# Patient Record
Sex: FEMALE | Race: OTHER | HISPANIC OR LATINO | Employment: FULL TIME | URBAN - METROPOLITAN AREA
[De-identification: names, ages, dates, MRNs, and addresses within clinical notes are randomized per-mention and may not be internally consistent; named-entity substitution may affect disease eponyms.]

---

## 2020-06-17 ENCOUNTER — HOSPITAL ENCOUNTER (EMERGENCY)
Facility: HOSPITAL | Age: 20
Discharge: HOME | End: 2020-06-18
Attending: EMERGENCY MEDICINE
Payer: COMMERCIAL

## 2020-06-17 DIAGNOSIS — O20.9 VAGINAL BLEEDING IN PREGNANCY, FIRST TRIMESTER: Primary | ICD-10-CM

## 2020-06-17 DIAGNOSIS — O23.41 URINARY TRACT INFECTION IN MOTHER DURING FIRST TRIMESTER OF PREGNANCY: ICD-10-CM

## 2020-06-17 LAB
B-HCG UR QL: POSITIVE
CTP QC/QA: YES

## 2020-06-17 PROCEDURE — 99284 EMERGENCY DEPT VISIT MOD MDM: CPT | Mod: 25

## 2020-06-17 PROCEDURE — 81025 URINE PREGNANCY TEST: CPT | Performed by: PHYSICIAN ASSISTANT

## 2020-06-17 PROCEDURE — 87077 CULTURE AEROBIC IDENTIFY: CPT

## 2020-06-17 PROCEDURE — 87186 SC STD MICRODIL/AGAR DIL: CPT

## 2020-06-17 PROCEDURE — 87086 URINE CULTURE/COLONY COUNT: CPT

## 2020-06-17 PROCEDURE — 81000 URINALYSIS NONAUTO W/SCOPE: CPT

## 2020-06-17 PROCEDURE — 85025 COMPLETE CBC W/AUTO DIFF WBC: CPT

## 2020-06-17 PROCEDURE — 80053 COMPREHEN METABOLIC PANEL: CPT

## 2020-06-17 PROCEDURE — 87088 URINE BACTERIA CULTURE: CPT

## 2020-06-17 PROCEDURE — 84702 CHORIONIC GONADOTROPIN TEST: CPT

## 2020-06-17 NOTE — Clinical Note
Ike Moore was seen and treated in our emergency department on 6/17/2020.  She may return to work on 06/19/2020.       If you have any questions or concerns, please don't hesitate to call.       LPN

## 2020-06-18 VITALS
RESPIRATION RATE: 18 BRPM | HEART RATE: 90 BPM | BODY MASS INDEX: 23.3 KG/M2 | DIASTOLIC BLOOD PRESSURE: 73 MMHG | WEIGHT: 108 LBS | SYSTOLIC BLOOD PRESSURE: 100 MMHG | TEMPERATURE: 99 F | HEIGHT: 57 IN

## 2020-06-18 LAB
ABO + RH BLD: NORMAL
ALBUMIN SERPL BCP-MCNC: 4.3 G/DL (ref 3.5–5.2)
ALP SERPL-CCNC: 80 U/L (ref 55–135)
ALT SERPL W/O P-5'-P-CCNC: 17 U/L (ref 10–44)
ANION GAP SERPL CALC-SCNC: 9 MMOL/L (ref 8–16)
AST SERPL-CCNC: 15 U/L (ref 10–40)
BACTERIA #/AREA URNS HPF: ABNORMAL /HPF
BASOPHILS # BLD AUTO: 0.03 K/UL (ref 0–0.2)
BASOPHILS NFR BLD: 0.3 % (ref 0–1.9)
BILIRUB SERPL-MCNC: 0.3 MG/DL (ref 0.1–1)
BILIRUB UR QL STRIP: NEGATIVE
BLD GP AB SCN CELLS X3 SERPL QL: NORMAL
BUN SERPL-MCNC: 9 MG/DL (ref 6–20)
CALCIUM SERPL-MCNC: 9.7 MG/DL (ref 8.7–10.5)
CHLORIDE SERPL-SCNC: 104 MMOL/L (ref 95–110)
CLARITY UR: CLEAR
CO2 SERPL-SCNC: 23 MMOL/L (ref 23–29)
COLOR UR: YELLOW
CREAT SERPL-MCNC: 0.8 MG/DL (ref 0.5–1.4)
DIFFERENTIAL METHOD: ABNORMAL
EOSINOPHIL # BLD AUTO: 0.1 K/UL (ref 0–0.5)
EOSINOPHIL NFR BLD: 0.7 % (ref 0–8)
ERYTHROCYTE [DISTWIDTH] IN BLOOD BY AUTOMATED COUNT: 12.1 % (ref 11.5–14.5)
EST. GFR  (AFRICAN AMERICAN): >60 ML/MIN/1.73 M^2
EST. GFR  (NON AFRICAN AMERICAN): >60 ML/MIN/1.73 M^2
GLUCOSE SERPL-MCNC: 71 MG/DL (ref 70–110)
GLUCOSE UR QL STRIP: NEGATIVE
HCG INTACT+B SERPL-ACNC: NORMAL MIU/ML
HCT VFR BLD AUTO: 40 % (ref 37–48.5)
HGB BLD-MCNC: 13.2 G/DL (ref 12–16)
HGB UR QL STRIP: ABNORMAL
IMM GRANULOCYTES # BLD AUTO: 0.04 K/UL (ref 0–0.04)
IMM GRANULOCYTES NFR BLD AUTO: 0.4 % (ref 0–0.5)
KETONES UR QL STRIP: NEGATIVE
LEUKOCYTE ESTERASE UR QL STRIP: NEGATIVE
LYMPHOCYTES # BLD AUTO: 1.8 K/UL (ref 1–4.8)
LYMPHOCYTES NFR BLD: 17.3 % (ref 18–48)
MCH RBC QN AUTO: 29.1 PG (ref 27–31)
MCHC RBC AUTO-ENTMCNC: 33 G/DL (ref 32–36)
MCV RBC AUTO: 88 FL (ref 82–98)
MICROSCOPIC COMMENT: ABNORMAL
MONOCYTES # BLD AUTO: 0.8 K/UL (ref 0.3–1)
MONOCYTES NFR BLD: 7.8 % (ref 4–15)
NEUTROPHILS # BLD AUTO: 7.8 K/UL (ref 1.8–7.7)
NEUTROPHILS NFR BLD: 73.5 % (ref 38–73)
NITRITE UR QL STRIP: POSITIVE
NRBC BLD-RTO: 0 /100 WBC
PH UR STRIP: 7 [PH] (ref 5–8)
PLATELET # BLD AUTO: 301 K/UL (ref 150–350)
PMV BLD AUTO: 10.3 FL (ref 9.2–12.9)
POTASSIUM SERPL-SCNC: 3.9 MMOL/L (ref 3.5–5.1)
PROT SERPL-MCNC: 7.9 G/DL (ref 6–8.4)
PROT UR QL STRIP: NEGATIVE
RBC # BLD AUTO: 4.53 M/UL (ref 4–5.4)
RBC #/AREA URNS HPF: 2 /HPF (ref 0–4)
SODIUM SERPL-SCNC: 136 MMOL/L (ref 136–145)
SP GR UR STRIP: 1.02 (ref 1–1.03)
URN SPEC COLLECT METH UR: ABNORMAL
UROBILINOGEN UR STRIP-ACNC: NEGATIVE EU/DL
WBC # BLD AUTO: 10.63 K/UL (ref 3.9–12.7)
WBC #/AREA URNS HPF: 20 /HPF (ref 0–5)

## 2020-06-18 PROCEDURE — 25000003 PHARM REV CODE 250: Performed by: EMERGENCY MEDICINE

## 2020-06-18 PROCEDURE — 86850 RBC ANTIBODY SCREEN: CPT

## 2020-06-18 RX ORDER — FAMOTIDINE 20 MG
1 TABLET ORAL DAILY
Qty: 30 TABLET | Refills: 0 | Status: SHIPPED | OUTPATIENT
Start: 2020-06-18 | End: 2021-06-18

## 2020-06-18 RX ORDER — CEPHALEXIN 500 MG/1
500 CAPSULE ORAL 4 TIMES DAILY
Qty: 20 CAPSULE | Refills: 0 | Status: SHIPPED | OUTPATIENT
Start: 2020-06-18 | End: 2020-06-23

## 2020-06-18 RX ORDER — CEPHALEXIN 500 MG/1
500 CAPSULE ORAL
Status: COMPLETED | OUTPATIENT
Start: 2020-06-18 | End: 2020-06-18

## 2020-06-18 RX ADMIN — CEPHALEXIN 500 MG: 500 CAPSULE ORAL at 01:06

## 2020-06-18 NOTE — ED PROVIDER NOTES
Encounter Date: 2020    SCRIBE #1 NOTE: I, Kierra Sandy, am scribing for, and in the presence of,  Favian He MD. I have scribed the entire note.       History     Chief Complaint   Patient presents with    Vaginal Bleeding     States she is 8 weeks pregnant.  Complaining of spotting.  Went to OB today.     Time seen by provider: 12:38 AM    This is a A1 8 WGA 20 y.o. female with no significant PMHx on file who presents with complaint of vaginal bleeding. Her associated symptoms include abdominal pain earlier this week that has since resolved. The patient denies dysuria or any other concerning symptoms. The patient has no other medical complaints or concerns at this time.     The history is provided by the patient.     Review of patient's allergies indicates:  No Known Allergies  No past medical history on file.  No past surgical history on file.  No family history on file.  Social History     Tobacco Use    Smoking status: Not on file   Substance Use Topics    Alcohol use: Not on file    Drug use: Not on file     Review of Systems   Genitourinary: Positive for vaginal bleeding. Negative for dysuria.   All other systems reviewed and are negative.      Physical Exam     Initial Vitals [20 2304]   BP Pulse Resp Temp SpO2   103/68 94 18 98.8 °F (37.1 °C) --      MAP       --         Physical Exam    Nursing note and vitals reviewed.  Constitutional: She appears well-developed and well-nourished. She is not diaphoretic. No distress.   HENT:   Head: Normocephalic and atraumatic.   Right Ear: Tympanic membrane normal.   Left Ear: Tympanic membrane normal.   Mouth/Throat: Oropharynx is clear and moist.   Eyes: Conjunctivae and EOM are normal. Pupils are equal, round, and reactive to light.   Neck: Normal range of motion. Neck supple.   Cardiovascular: Normal rate, regular rhythm and normal heart sounds. Exam reveals no gallop and no friction rub.    No murmur heard.  Pulmonary/Chest: Breath  sounds normal. She has no wheezes. She has no rhonchi. She has no rales.   Abdominal: Soft. Bowel sounds are normal. There is no abdominal tenderness. There is no rebound and no guarding.   Musculoskeletal: Normal range of motion. No tenderness or edema.   Lymphadenopathy:     She has no cervical adenopathy.   Neurological: She is alert and oriented to person, place, and time. She has normal strength.   Skin: Skin is warm and dry. Capillary refill takes less than 2 seconds. No rash noted.         ED Course   Procedures  Labs Reviewed   CBC W/ AUTO DIFFERENTIAL - Abnormal; Notable for the following components:       Result Value    Gran # (ANC) 7.8 (*)     Gran% 73.5 (*)     Lymph% 17.3 (*)     All other components within normal limits   URINALYSIS, REFLEX TO URINE CULTURE - Abnormal; Notable for the following components:    Occult Blood UA 2+ (*)     Nitrite, UA Positive (*)     All other components within normal limits    Narrative:     Preferred Collection Type->Urine, Clean Catch  Specimen Source->Urine   URINALYSIS MICROSCOPIC - Abnormal; Notable for the following components:    WBC, UA 20 (*)     Bacteria Many (*)     All other components within normal limits    Narrative:     Preferred Collection Type->Urine, Clean Catch  Specimen Source->Urine   POCT URINE PREGNANCY - Abnormal; Notable for the following components:    POC Preg Test, Ur Positive (*)     All other components within normal limits   CULTURE, URINE   COMPREHENSIVE METABOLIC PANEL   HCG, QUANTITATIVE, PREGNANCY   TYPE & SCREEN            X-Rays:   Independently Interpreted Readings:   Other Readings:  Reviewed by myself, read by radiology.     Imaging Results          US OB <14 Wks TransAbd & TransVag, Single Gestation (XPD) (Final result)  Result time 06/18/20 01:14:54    Final result by Delmy Man MD (06/18/20 01:14:54)                 Impression:      Single intrauterine pregnancy which corresponds to a 6 weeks 4 days gestation by crown  rump length. Fetal heart rate is 109 BPM.  [facility] by ultrasound 2021.      Electronically signed by: Delmy Man MD  Date:    2020  Time:    01:14             Narrative:    EXAMINATION:  US OB <14 WEEKS, TRANSABDOM & TRANSVAG, SINGLE GESTATION (XPD)    CLINICAL HISTORY:  vaginal bleeding in pregnancy;    TECHNIQUE:  Transabdominal sonography of the pelvis was performed, followed by transvaginal sonography to better evaluate the uterus and ovaries.    COMPARISON:  None.    FINDINGS:  The uterus measures 7.3 x 4.4 x 5.4 cm. Uterine parenchyma is heterogenous in echotexture. In the uterine cavity there is a gestational sac with fetal pole which measures 0.6 cm by crown rump length and corresponds to a 6 weeks 4 days gestation. Fetal heart rate is 109 BPM. The yolk sac measures 0.3 cm.    The right ovary measures 2.9 x 2.5 x 1.8 cm. The left ovary measures 2.4 x 3.4 x 1.2 cm. Arterial and venous flow are preserved bilaterally.  No adnexal abnormalities are seen.  No significant free fluid is seen.                              Medical Decision Making:   Initial Assessment:   20-year-old female,  approximately 8 weeks pregnant presents the ER for evaluation of abdominal cramping and vaginal bleeding.  Onset earlier this week.  Came to the ER for further evaluation.  Resting comfortably in bed, no acute distress physical exam grossly unremarkable.  Differential includes miscarriage, ectopic pregnancy, vaginal bleeding in early pregnancy, UTI versus other cause.  Will obtain blood work UA ultrasound will reassess.    Clinical Tests:   Lab Tests: Reviewed and Ordered  Radiological Study: Reviewed and Ordered              Attending Attestation:           Physician Attestation for Scribe:  Physician Attestation Statement for Scribe #1: Favian BOX MD, reviewed documentation, as scribed by Kierra Delgado in my presence, and it is both accurate and complete.                 ED Course as of 230    Thu Jun 18, 2020   0040 My Scribe Attestation: I acknowledge that the documentation on this chart was provided by described on the date of service noted above and that the documentation in the chart accurately reflects work and decisions made by me alone.        [SE]   0156 Resting comfortably in bed, no acute distress.  Ultrasound confirmed intrauterine pregnancy.  Urine nitrate positive.  Will give Keflex.  Discussed with patient.  Discussed plan discharge home, Keflex prenatal vitamins she has no OBGYN will follow up with her.  Return precautions discussed.  Patient understood agreed plan patient will be discharged.    [SE]      ED Course User Index  [SE] Favian He MD                Clinical Impression:       ICD-10-CM ICD-9-CM   1. Vaginal bleeding in pregnancy, first trimester  O20.9 640.93   2. Urinary tract infection in mother during first trimester of pregnancy  O23.41 646.63     599.0         Disposition:   Disposition: Discharged  Condition: Stable     ED Disposition Condition    Discharge Stable        ED Prescriptions     Medication Sig Dispense Start Date End Date Auth. Provider    cephALEXin (KEFLEX) 500 MG capsule Take 1 capsule (500 mg total) by mouth 4 (four) times daily. for 5 days 20 capsule 6/18/2020 6/23/2020 Favian He MD    prenatal 21-iron fu-folic acid (PRENATAL COMPLETE) 14 mg iron- 400 mcg Tab Take 1 tablet by mouth once daily. 30 tablet 6/18/2020 6/18/2021 Favian He MD        Follow-up Information     Follow up With Specialties Details Why Contact Info    OBGYN    Please follow up with her OBGYN.                                     Favian He MD  06/18/20 0588

## 2020-06-18 NOTE — ED NOTES
"Pt to ED with c/o spotting. Pt states she is approx 8 weeks pregnant and that she has been spotting since Monday. She went to her OB today and she reports "they could not do an ultrasound due to the bleeding incase something is wrong." Patient reports she is not soaking and maik pads at home and that she notices the blood only when wiping herself after using the restroom.    Patient identifiers for Ike Moore verified by spelling and stated name on armband along with .     APPEARANCE: Alert, oriented and in no acute distress.  CARDIAC: Normal rate, no murmur heard.   PERIPHERAL VASCULAR: peripheral pulses present. Normal cap refill. No edema. Warm to touch.    GASTRO: + pregnancy  MUSC: Full ROM. No bony tenderness or soft tissue tenderness. No obvious deformity.  SKIN: Skin is warm and dry, normal skin turgor, mucous membranes moist.  MENTAL STATUS: awake, alert and aware of environment.    "

## 2020-06-18 NOTE — ED NOTES
Pt lying on stretcher with eyes closed. Respirations even and unlabored. Will continue to monitor.

## 2020-06-18 NOTE — PROVIDER PROGRESS NOTES - EMERGENCY DEPT.
Emergency Department TeleTRIAGE Encounter Note      CHIEF COMPLAINT    Chief Complaint   Patient presents with    Vaginal Bleeding     States she is 8 weeks pregnant.  Complaining of spotting.  Went to OB today.       VITAL SIGNS   Initial Vitals [20 2304]   BP Pulse Resp Temp SpO2   103/68 94 18 98.8 °F (37.1 °C) --      MAP       --            ALLERGIES    Review of patient's allergies indicates:  Not on File    PROVIDER TRIAGE NOTE  Patient is a 20-year-old female presenting to the emergency department for evaluation of vaginal bleeding in pregnancy.  Patient states that she believes she is about 8 weeks pregnant.  She has been experiencing spotting with heavier bleeding over the past few days.  She does report lower abdominal cramping.  She states that she tried to be seen at a women's clinic today but they would not perform an ultrasound due to her bleeding.  They recommended she come to the emergency department.  .  Patient's LMP was 4/3/2020 which would make her 10 weeks 5 days.  Workup initiated including labs, ultrasound.      ORDERS  Labs Reviewed   C. TRACHOMATIS/N. GONORRHOEAE BY AMP DNA   CBC W/ AUTO DIFFERENTIAL   COMPREHENSIVE METABOLIC PANEL   HCG, QUANTITATIVE, PREGNANCY   URINALYSIS, REFLEX TO URINE CULTURE   VAGINAL SCREEN   POCT URINE PREGNANCY   TYPE & SCREEN       ED Orders (720h ago, onward)    Start Ordered     Status Ordering Provider    203 20 2313  Vaginal Screen Vagina  STAT  Collect    Ordered DOUGLAS HENDRICKSON    20 2313 20 2313   OB <14 Wks TransAbd & TransVag, Single Gestation (XPD)  1 time imaging      Ordered DOUGLAS HENDRICKSON    20 2312 20 2313  Type & Screen  Once      Ordered DOUGLAS HENDRICKSON    20 2312 20 2313  Setup Pelvic Tray  Once      Ordered DOUGLAS HENDRICKSON    20 2312 20 2313  C. trachomatis/N. gonorrhoeae by AMP DNA [facility]; Vagina  STAT  Collect    Ordered DOUGLAS HENDRICKSON    20 2311 20 2313   CBC auto differential  STAT  Collect    Ordered TAYEH, DOUGLAS    06/17/20 2311 06/17/20 2313  Comprehensive metabolic panel  STAT  Collect    Ordered TAYEH, DOUGLAS    06/17/20 2311 06/17/20 2313  hCG, quantitative, pregnancy  STAT  Collect    Ordered TAYEH, DOUGLAS    06/17/20 2311 06/17/20 2313  POCT urine pregnancy  Once      Ordered TAYEH, DOUGLAS    06/17/20 2311 06/17/20 2313  Urinalysis, Reflex to Urine Culture Urine, Clean Catch  STAT  Collect    Ordered TAYEH DOUGLAS            Virtual Visit Note: The provider triage portion of this emergency department evaluation and documentation was performed via [employer], a HIPAA-compliant telemedicine application, in concert with a tele-presenter in the room. A face to face patient evaluation with one of my colleagues will occur once the patient is placed in an emergency department room.      DISCLAIMER: This note was prepared with [employer] voice recognition transcription software. Garbled syntax, mangled pronouns, and other bizarre constructions may be attributed to that software system.

## 2020-06-20 LAB — BACTERIA UR CULT: ABNORMAL

## 2020-11-19 ENCOUNTER — HOSPITAL ENCOUNTER (OUTPATIENT)
Facility: HOSPITAL | Age: 20
Discharge: HOSPICE | End: 2020-11-20
Attending: OBSTETRICS & GYNECOLOGY | Admitting: OBSTETRICS & GYNECOLOGY
Payer: MEDICAID

## 2020-11-20 VITALS
OXYGEN SATURATION: 99 % | SYSTOLIC BLOOD PRESSURE: 113 MMHG | HEART RATE: 97 BPM | DIASTOLIC BLOOD PRESSURE: 61 MMHG | TEMPERATURE: 99 F

## 2020-11-20 LAB
BILIRUB UR QL STRIP: NEGATIVE
CLARITY UR: CLEAR
COLOR UR: ABNORMAL
GLUCOSE UR QL STRIP: ABNORMAL
HGB UR QL STRIP: NEGATIVE
KETONES UR QL STRIP: NEGATIVE
LEUKOCYTE ESTERASE UR QL STRIP: NEGATIVE
NITRITE UR QL STRIP: NEGATIVE
PH UR STRIP: 6 [PH] (ref 5–8)
PROT UR QL STRIP: NEGATIVE
RUPTURE OF MEMBRANE: NEGATIVE
SP GR UR STRIP: 1.01 (ref 1–1.03)
URN SPEC COLLECT METH UR: ABNORMAL
UROBILINOGEN UR STRIP-ACNC: NEGATIVE EU/DL

## 2020-11-20 PROCEDURE — 84112 EVAL AMNIOTIC FLUID PROTEIN: CPT

## 2020-11-20 PROCEDURE — 81003 URINALYSIS AUTO W/O SCOPE: CPT

## 2020-11-20 PROCEDURE — 99201 *HC E&M-NEW PATIENT-LVL I: CPT

## 2020-11-20 NOTE — NURSING
Dr. York given report on pt. Ordered to send of ROM + and UA. If both tests come back negative patient okay to go home. Per MD Jaylen patient to take Tylenol at home when in pain and Pt to f/u with Dr. Hendrickson in the am.

## 2020-11-20 NOTE — NURSING
"Received report from ED, 20 year old G 1 P 0 at 29 wks and 3 days with c/o leaking clear fluid and lower abdominal pressure and pain that started "several weeks ago". Pt states leaking is enough to saturate mini menstrual pad . Pt denies back pain or s/s with urination.  History/complications of n/a. Denies vaginal bleeding. Fetus: fetal heart tones 140s, + fetal movements. Contractions: irritiability. Abdomen soft/nontender. Vital signs WNL Cervix: closed .Educated on electronic fetal monitoring and assessments. Questions answered. Will update Dr. York. Pt of Dr. Hendrickson.  "

## 2020-11-20 NOTE — NURSING
Patient D/C home per MD Jaylen order. VS stable, patient in no active distress, and no complaints at this time. Patient given AVS. Explained to patient to return to ED if she experiences vaginal bleeding, vaginal leaking, decreased fetal movement, and/or abdominal pain. Pt instructed to call Dr. Hendrickson in the am. Pt instructed to take Tylenol when exp pain. Patient verbalized complete understanding of all discharge instructions and follow up care. Pt ambulating off unit with sig other.

## 2021-08-21 ENCOUNTER — CLINICAL SUPPORT (OUTPATIENT)
Dept: URGENT CARE | Facility: CLINIC | Age: 21
End: 2021-08-21
Payer: MEDICAID

## 2021-08-21 DIAGNOSIS — U07.1 COVID-19 VIRUS DETECTED: ICD-10-CM

## 2021-08-21 DIAGNOSIS — Z11.52 ENCOUNTER FOR SCREENING FOR COVID-19: Primary | ICD-10-CM

## 2021-08-21 LAB
CTP QC/QA: YES
SARS-COV-2 RDRP RESP QL NAA+PROBE: POSITIVE

## 2021-08-21 PROCEDURE — 99211 PR OFFICE/OUTPT VISIT, EST, LEVL I: ICD-10-PCS | Mod: S$GLB,,, | Performed by: FAMILY MEDICINE

## 2021-08-21 PROCEDURE — U0002 COVID-19 LAB TEST NON-CDC: HCPCS | Mod: QW,S$GLB,, | Performed by: FAMILY MEDICINE

## 2021-08-21 PROCEDURE — U0002: ICD-10-PCS | Mod: QW,S$GLB,, | Performed by: FAMILY MEDICINE

## 2021-08-21 PROCEDURE — 99211 OFF/OP EST MAY X REQ PHY/QHP: CPT | Mod: S$GLB,,, | Performed by: FAMILY MEDICINE

## 2021-10-09 ENCOUNTER — CLINICAL SUPPORT (OUTPATIENT)
Dept: URGENT CARE | Facility: CLINIC | Age: 21
End: 2021-10-09
Payer: MEDICAID

## 2021-10-09 DIAGNOSIS — Z11.59 SPECIAL SCREENING EXAMINATION FOR VIRAL DISEASE: Primary | ICD-10-CM

## 2021-10-09 LAB
CTP QC/QA: YES
SARS-COV-2 RDRP RESP QL NAA+PROBE: NEGATIVE

## 2021-10-09 PROCEDURE — U0002: ICD-10-PCS | Mod: QW,S$GLB,, | Performed by: PHYSICIAN ASSISTANT

## 2021-10-09 PROCEDURE — U0002 COVID-19 LAB TEST NON-CDC: HCPCS | Mod: QW,S$GLB,, | Performed by: PHYSICIAN ASSISTANT

## 2022-02-15 ENCOUNTER — OFFICE VISIT (OUTPATIENT)
Dept: URGENT CARE | Facility: CLINIC | Age: 22
End: 2022-02-15
Payer: MEDICAID

## 2022-02-15 VITALS
OXYGEN SATURATION: 98 % | TEMPERATURE: 99 F | DIASTOLIC BLOOD PRESSURE: 66 MMHG | SYSTOLIC BLOOD PRESSURE: 115 MMHG | BODY MASS INDEX: 23.3 KG/M2 | HEIGHT: 57 IN | WEIGHT: 108 LBS | RESPIRATION RATE: 16 BRPM | HEART RATE: 88 BPM

## 2022-02-15 DIAGNOSIS — J06.9 URI, ACUTE: ICD-10-CM

## 2022-02-15 DIAGNOSIS — R05.9 COUGH: Primary | ICD-10-CM

## 2022-02-15 LAB
CTP QC/QA: YES
SARS-COV-2 RDRP RESP QL NAA+PROBE: NEGATIVE

## 2022-02-15 PROCEDURE — 3074F SYST BP LT 130 MM HG: CPT | Mod: CPTII,S$GLB,, | Performed by: FAMILY MEDICINE

## 2022-02-15 PROCEDURE — U0002 COVID-19 LAB TEST NON-CDC: HCPCS | Mod: QW,S$GLB,, | Performed by: FAMILY MEDICINE

## 2022-02-15 PROCEDURE — 3078F PR MOST RECENT DIASTOLIC BLOOD PRESSURE < 80 MM HG: ICD-10-PCS | Mod: CPTII,S$GLB,, | Performed by: FAMILY MEDICINE

## 2022-02-15 PROCEDURE — 99213 OFFICE O/P EST LOW 20 MIN: CPT | Mod: S$GLB,,, | Performed by: FAMILY MEDICINE

## 2022-02-15 PROCEDURE — 1159F PR MEDICATION LIST DOCUMENTED IN MEDICAL RECORD: ICD-10-PCS | Mod: CPTII,S$GLB,, | Performed by: FAMILY MEDICINE

## 2022-02-15 PROCEDURE — 99213 PR OFFICE/OUTPT VISIT, EST, LEVL III, 20-29 MIN: ICD-10-PCS | Mod: S$GLB,,, | Performed by: FAMILY MEDICINE

## 2022-02-15 PROCEDURE — 3008F PR BODY MASS INDEX (BMI) DOCUMENTED: ICD-10-PCS | Mod: CPTII,S$GLB,, | Performed by: FAMILY MEDICINE

## 2022-02-15 PROCEDURE — U0002: ICD-10-PCS | Mod: QW,S$GLB,, | Performed by: FAMILY MEDICINE

## 2022-02-15 PROCEDURE — 1159F MED LIST DOCD IN RCRD: CPT | Mod: CPTII,S$GLB,, | Performed by: FAMILY MEDICINE

## 2022-02-15 PROCEDURE — 3078F DIAST BP <80 MM HG: CPT | Mod: CPTII,S$GLB,, | Performed by: FAMILY MEDICINE

## 2022-02-15 PROCEDURE — 3008F BODY MASS INDEX DOCD: CPT | Mod: CPTII,S$GLB,, | Performed by: FAMILY MEDICINE

## 2022-02-15 PROCEDURE — 3074F PR MOST RECENT SYSTOLIC BLOOD PRESSURE < 130 MM HG: ICD-10-PCS | Mod: CPTII,S$GLB,, | Performed by: FAMILY MEDICINE

## 2022-02-15 RX ORDER — LORATADINE 10 MG/1
10 TABLET ORAL DAILY
Qty: 30 TABLET | Refills: 2 | Status: SHIPPED | OUTPATIENT
Start: 2022-02-15 | End: 2023-02-15

## 2022-02-15 NOTE — PROGRESS NOTES
"Subjective:       Patient ID: Ike Moore is a 21 y.o. female.    Vitals:  height is 4' 9" (1.448 m) and weight is 49 kg (108 lb). Her temperature is 98.7 °F (37.1 °C). Her blood pressure is 115/66 and her pulse is 88. Her respiration is 16 and oxygen saturation is 98%.     Chief Complaint: Cough    Pt presents a cough and sore throat for three days. Denies f/c or body ache    Pt is vaccinated      Cough  This is a new problem. The current episode started in the past 7 days. The problem has been unchanged. The problem occurs constantly. The cough is non-productive. Associated symptoms include a sore throat. Nothing aggravates the symptoms. She has tried nothing for the symptoms. The treatment provided no relief.       HENT: Positive for sore throat.    Respiratory: Positive for cough.        Objective:      Physical Exam   Constitutional: She is oriented to person, place, and time. She appears well-developed and well-nourished. She is cooperative.  Non-toxic appearance. She does not appear ill. No distress.   HENT:   Head: Normocephalic and atraumatic.   Ears:   Right Ear: Hearing, tympanic membrane, external ear and ear canal normal.   Left Ear: Hearing, tympanic membrane, external ear and ear canal normal.   Nose: Nose normal. No mucosal edema, rhinorrhea or nasal deformity. No epistaxis. Right sinus exhibits no maxillary sinus tenderness and no frontal sinus tenderness. Left sinus exhibits no maxillary sinus tenderness and no frontal sinus tenderness.   Mouth/Throat: Uvula is midline, oropharynx is clear and moist and mucous membranes are normal. Mucous membranes are moist. No trismus in the jaw. Normal dentition. No uvula swelling. No posterior oropharyngeal erythema. Oropharynx is clear.   Eyes: Conjunctivae and lids are normal. Pupils are equal, round, and reactive to light. Right eye exhibits no discharge. Left eye exhibits no discharge. No scleral icterus.      extraocular movement intact   Neck: " Trachea normal and phonation normal. Neck supple.   Cardiovascular: Normal rate, regular rhythm, normal heart sounds, intact distal pulses and normal pulses.   Pulmonary/Chest: Effort normal and breath sounds normal. No respiratory distress.   Abdominal: Normal appearance and bowel sounds are normal. She exhibits no distension, no pulsatile midline mass and no mass. Soft. There is no abdominal tenderness.   Musculoskeletal: Normal range of motion.         General: No deformity or edema. Normal range of motion.   Neurological: She is alert and oriented to person, place, and time. She exhibits normal muscle tone. Coordination normal.   Skin: Skin is warm, dry, intact, not diaphoretic and not pale.   Psychiatric: She has a normal mood and affect. Her speech is normal and behavior is normal. Judgment and thought content normal. Cognition and memory  Nursing note and vitals reviewed.        Assessment:       1. Cough    2. URI, acute          Plan:         Cough  -     POCT COVID-19 Rapid Screening    URI, acute    Other orders  -     loratadine (CLARITIN) 10 mg tablet; Take 1 tablet (10 mg total) by mouth once daily.  Dispense: 30 tablet; Refill: 2                Results for orders placed or performed in visit on 10/09/21   POCT COVID-19 Rapid Screening   Result Value Ref Range    POC Rapid COVID Negative Negative     Acceptable Yes